# Patient Record
Sex: MALE | Race: ASIAN
[De-identification: names, ages, dates, MRNs, and addresses within clinical notes are randomized per-mention and may not be internally consistent; named-entity substitution may affect disease eponyms.]

---

## 2020-01-13 ENCOUNTER — HOSPITAL ENCOUNTER (INPATIENT)
Dept: HOSPITAL 53 - M ED | Age: 20
LOS: 2 days | Discharge: HOME | DRG: 882 | End: 2020-01-15
Attending: PSYCHIATRY & NEUROLOGY | Admitting: PSYCHIATRY & NEUROLOGY
Payer: COMMERCIAL

## 2020-01-13 VITALS — DIASTOLIC BLOOD PRESSURE: 97 MMHG | SYSTOLIC BLOOD PRESSURE: 140 MMHG

## 2020-01-13 VITALS — BODY MASS INDEX: 21.76 KG/M2 | WEIGHT: 138.67 LBS | HEIGHT: 67 IN

## 2020-01-13 DIAGNOSIS — F43.20: Primary | ICD-10-CM

## 2020-01-13 LAB
ALBUMIN SERPL BCG-MCNC: 3.9 GM/DL (ref 3.2–5.2)
ALT SERPL W P-5'-P-CCNC: 31 U/L (ref 12–78)
AMPHETAMINES UR QL SCN: NEGATIVE
APAP SERPL-MCNC: < 2 UG/ML (ref 10–30)
BARBITURATES UR QL SCN: NEGATIVE
BENZODIAZ UR QL SCN: NEGATIVE
BILIRUB CONJ SERPL-MCNC: 0.1 MG/DL (ref 0–0.2)
BILIRUB SERPL-MCNC: 0.2 MG/DL (ref 0.2–1)
BUN SERPL-MCNC: 13 MG/DL (ref 7–18)
BZE UR QL SCN: NEGATIVE
CALCIUM SERPL-MCNC: 8.4 MG/DL (ref 8.5–10.1)
CANNABINOIDS UR QL SCN: NEGATIVE
CHLORIDE SERPL-SCNC: 110 MEQ/L (ref 98–107)
CO2 SERPL-SCNC: 26 MEQ/L (ref 21–32)
CREAT SERPL-MCNC: 0.93 MG/DL (ref 0.7–1.3)
ETHANOL SERPL-MCNC: 0.12 % (ref 0–0.01)
GLUCOSE SERPL-MCNC: 97 MG/DL (ref 70–100)
HCT VFR BLD AUTO: 45.9 % (ref 42–52)
HGB BLD-MCNC: 15.2 G/DL (ref 13.5–17.5)
MCH RBC QN AUTO: 30.2 PG (ref 27–33)
MCHC RBC AUTO-ENTMCNC: 33.1 G/DL (ref 32–36.5)
MCV RBC AUTO: 91.3 FL (ref 80–96)
METHADONE UR QL SCN: NEGATIVE
OPIATES UR QL SCN: NEGATIVE
PCP UR QL SCN: NEGATIVE
PLATELET # BLD AUTO: 240 10^3/UL (ref 150–450)
POTASSIUM SERPL-SCNC: 4.4 MEQ/L (ref 3.5–5.1)
PROT SERPL-MCNC: 7.5 GM/DL (ref 6.4–8.2)
RBC # BLD AUTO: 5.03 10^6/UL (ref 4.3–6.1)
SALICYLATES SERPL-MCNC: < 1.7 MG/DL (ref 5–30)
SODIUM SERPL-SCNC: 144 MEQ/L (ref 136–145)
TSH SERPL DL<=0.005 MIU/L-ACNC: 0.6 UIU/ML (ref 0.46–3.98)
WBC # BLD AUTO: 5.7 10^3/UL (ref 4–10)

## 2020-01-13 RX ADMIN — Medication SCH MG: at 18:55

## 2020-01-13 RX ADMIN — SERTRALINE HYDROCHLORIDE SCH MG: 50 TABLET ORAL at 21:00

## 2020-01-14 VITALS — DIASTOLIC BLOOD PRESSURE: 66 MMHG | SYSTOLIC BLOOD PRESSURE: 128 MMHG

## 2020-01-14 VITALS — SYSTOLIC BLOOD PRESSURE: 120 MMHG | DIASTOLIC BLOOD PRESSURE: 69 MMHG

## 2020-01-14 VITALS — DIASTOLIC BLOOD PRESSURE: 69 MMHG | SYSTOLIC BLOOD PRESSURE: 120 MMHG

## 2020-01-14 RX ADMIN — Medication SCH MG: at 09:39

## 2020-01-14 RX ADMIN — Medication SCH MG: at 21:03

## 2020-01-14 RX ADMIN — SERTRALINE HYDROCHLORIDE SCH MG: 50 TABLET ORAL at 21:00

## 2020-01-14 NOTE — MHHPEPDOC
[facility] History & Physical


History and Physical


DATE OF ADMISSION: Jan 13, 2020 at 16:38








New Patient


Yasmany Dumont


MRN: N/[id number]


Date of Birth: N/A


Date of Service: 01/14/2020


Chief Complaint


"I had a moment."





History of Present Illness


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

presents after becoming stressed after recently starting at [place] straight 

out of basic, he had had significant stressors including a girlfriend that has 

left him where he became increasingly upset. He had done cutting for self 

soothing. He had shown this to his superior officer when he was brought into the

ER and admitted out of an abundance of caution.





The patient during his entire stay had denied being suicidal or homicidal 

ideation and that the cutting was not done to kill himself but simply to cope. 

The patient was met with or reported that he was not being "appreciative" of the

different things that he has. He was reporting that he had made some poor deci

sions in the multiple stressors and misses his family. However, he denies any 

symptoms consistent with clinical depression. He reports that the stressors had 

come at him all at once. Other than a low mood at times, he denies anything 

else.





Review Of Systems


Depression: Denies. Unprovoked depression. Reports symptoms consistent with 

adjustment disorder.





Anxiety: The patient denies any excessive worry associated with physical 

symptoms. They deny any experience of discreet panic in the past.





[place]: The patient denies any episodes of euphoria/dysphoria associated with 

decreased need for sleep, hedonism, talkatively or impulsivity lasting longer 

than 5 days.





Psychotic: The patient denies any experiences of auditory or visual 

hallucinations. They deny any episodes of paranoia or delusional thinking in the

past





Trauma: The patient denies any traumatic events associated with nightmares or 

intrusive thoughts.





Borderline: The patient screens negative for borderline personality at this 

junction.





Past Psychiatric History


The patient reports no history of psychiatric admissions, medication trials or 

current follow up.





Denies any history of suicide attempts.





Allergies


Please see below.





Family Psychiatric History


The patient denies/is unaware any history of mental health history including 

addictions and suicide.





Social History


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

in the [facility]. Very few supports in the local area. No legal trouble. Subsist 

on  income. Graduated high school. Grew up with parents,  with a 

close relationship, grew up primarily in [place]. Describes he has 2 younger b

valentinoers with a close relationship as well. Has only been in the  for 5 

months, recently has left basic training and has arrived to [place].





Substance Abuse History


The patient denies any excessive alcohol use, tobacco or illicit drug use, 

denies history of substance use treatment.





Medical History


Patient has no significant past medical history.





Mental Status Examination


General: Well dressed with good hygiene





Speech: Spontaneous and fluid





Thought processes: Linear and logical





MSK: Smooth and coordinated gait, no signs of tremors or involuntary orofacial 

movements





Thought content: Future orientated





Abstract reasoning, and computation: Intact





Description of associations: Intact





Description of abnormal or psychotic thoughts: Denies any suicidal or homicidal 

ideation. Denies any auditory or visual hallucinations. Does not appear to be 

responding to internal stimuli. Does not appear to be endorsing any bizarre or 

paranoid ideation.





Judgment: fair





Insight: fair





Orientation: Alert and orientated 3





Cognition: Grossly normal





Recent and remote memory: Intact





Attention span and concentration: Intact





Fund of knowledge: Adequate





Mood: "okay"





Affect: Euthymic with a full range





Diagnoses


Adjustment disorder, mild.





Assessment and Plan


Adjustment disorder: Discussed with patient the medication options versus 

supportive treatment and therapy, patient elects for psychotherapy at this time.

The patient will be discharged tomorrow as he does not meet involuntary 

criteria, denying suicidal and homicidal ideation through his observation and is

unlikely to be meeting criteria for extension of an involuntary admission that 

he is on. He declines voluntary and will be discharged home tomorrow.





Disposition


Discharged to [facility] tomorrow. 





Problem List


1. Risk for suicide.





2. Depression.





3. Ineffective coping.





Initial Treatment Plan


1. Patient was admitted on a 9.39 legal status. 


2. Complete history was obtained.


3. With patients permission, family will be contacted and database will be 

expanded. 


4. Patients medication regimen will be reviewed and changed accordingly. 


5. Patient will be provided with protected environment. 


6. Patient will be treated with individual, group, and milieu therapies. 


7. Patient will receive supportive psych-education.


8. Discharge planning will commence immediately.


9. Outpatient follow-up treatment will be strongly recommended.


10. The initial treatment plan will focus initially on:





Estimated Length Of Stay


2 days.





Time Spent


70 minutes.





Tuesday





Vital Signs





Vital Signs








  Date Time  Temp Pulse Resp B/P (MAP) Pulse Ox O2 Delivery O2 Flow Rate FiO2


 


1/14/20 07:07 98.6 89 16 128/66 (86)    


 


1/13/20 18:22     99 Room Air  











Medications


Scheduled PRN


Trazodone HCl (Trazodone HCl) 50 Mg Tablet, 50 MG PO QHSP PRN for INSOMNIA





Allergies


Coded Allergies:  


     No Known Allergies (Unverified , 1/13/20)











KALE ALCALA DO              Jan 14, 2020 09:50

## 2020-01-14 NOTE — CR.PDOC
General


Date of Consultation:  Jan 14, 2020





Consultation


REASON FOR CONSULTATION/CHIEF COMPLAINT: Admitted to inpatient mental health 

unit after cutting himself. 





HISTORY OF PRESENT ILLNESS: 19-year-old male, active , no significant 

past medical or psychiatric history is admitted to inpatient mental health unit 

after cutting himself. Patient reports feeling overwhelmed with his current 

duties in the  interfering with his family/social responsibilities. 

Patient wishes to be at home with his brothers who need his help and his uncle 

who is passing away from malignancy. Patient denies attempting to commit suicide

by cutting himself, reports using that as a way to relieve his tension. He has 

no medical complaints at this time, denies any shortness of breath, chest pain, 

nausea, vomiting, abdominal pain, diarrhea or constipation. He denies any 

suicidal ideation at this time.. 





10 point review of system is negative except for above





ALLERGIES: Please see below.





HOME MEDICATIONS: Please see below.





PAST MEDICAL HISTORY:


1. None.





PAST SURGICAL HISTORY: 


1. None





FAMILY HISTORY:


Negative for cancer, heart disease in his parents





SOCIAL HISTORY:


Never smoker.


Social alcohol use.


Denies drug use





PHYSICAL EXAMINATION:


VITAL SIGNS: Please see below.


GENERAL: No distress


HEENT: Normocephalic, atraumatic, moist mucous membranes


NECK: Supple


CARDIOVASCULAR EXAMINATION: S1, S2, no murmurs


RESPIRATORY EXAMINATION: Clear to auscultation, no wheezing


ABDOMINAL EXAMINATION: Soft, nontender, nondistended, positive bowel sounds


EXTREMITIES: Range of motion intact


SKIN: No rash


NEUROLOGICAL EXAMINATION: Alert and oriented 3, no focal deficits 


PSYCHIATRIC EXAMINATION: Calm and cooperative





LABORATORY DATA: Please see below.





ASSESSMENT/PLAN: 19-year-old male without any significant psychiatric or medical

history is admitted to inpatient mental health unit after cutting himself.


1. Self cutting.


 Management as per primary team





Patient has no active medical issues at this time, please reconsult if needed.





Vital Signs/I&O





Vital Signs








  Date Time  Temp Pulse Resp B/P (MAP) Pulse Ox O2 Delivery O2 Flow Rate FiO2


 


1/14/20 07:07 98.6 89 16 128/66 (86)    


 


1/13/20 18:22     99 Room Air  











Allergies


Coded Allergies:  


     No Known Allergies (Unverified , 1/13/20)





Home Medications


Scheduled PRN


Trazodone HCl (Trazodone HCl) 50 Mg Tablet, 50 MG PO QHSP PRN for INSOMNIA for 7

Days, #7











GONDAL,KHUBAIB N. MD           Jan 14, 2020 14:14

## 2020-01-15 VITALS — DIASTOLIC BLOOD PRESSURE: 70 MMHG | SYSTOLIC BLOOD PRESSURE: 115 MMHG

## 2020-01-15 NOTE — MHDSPDOC
[facility] Discharge Summary


Discharge Summary


DATE OF ADMISSION: Jan 13, 2020 at 16:38 


DATE OF DISCHARGE: 1/15/20








Discharge


Yasmany Dumont


MRN: N/A


Date of Birth: N/A


Date of Service: 01/15/2020


Diagnoses


Adjustment disorder, mild.





History of Present Illness


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

presents after becoming stressed after recently starting at [place] straight 

out of [employer], he had had significant stressors including a girlfriend that has 

left him where he became increasingly upset. He had done cutting for self 

soothing. He had shown this to his superior officer when he was brought into the

ER and admitted out of an abundance of caution.





The patient during his entire stay had denied being suicidal or homicidal 

ideation and that the cutting was not done to kill himself but simply to cope. 

The patient was met with or reported that he was not being "appreciative" of the

different things that he has. He was reporting that he had made some poor 

decisions in the multiple stressors and misses his family. However, he denies 

any symptoms consistent with clinical depression. He reports that the stressors 

had come at him all at once. Other than a low mood at times, he denies anything 

else.





Consultants Involved


Hospitalist/PCP screening





Treatment and Progress On The Unit


The patient was admitted to the inpatient mental health unit out of an abundance

of caution. He had been denying suicidal and homicidal ideations after the 

reported self-soothing cutting. The patient did well on the unit, was 

cooperative, engaged, friendly and had requested discharge shortly after his 

presentation. During his stay, we had discussed medications and the risks and 

benefits as well as therapies given his particular diagnosis, which the patient 

elected to try therapy alone. He reported that his stay did give him perspective

on how good his situation was. He was discharged in good summer as he no longer 

met involuntary criteria and declined further voluntary.





Discharge Assessment


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 

current situations, presents for concern of suicide. After 48 hours of 

observation reveals no suicidal or homicidal ideation, normal mental status 

exam, cooperative with discharge planning, a good and fair insight and judgment 

while on the unit. He declines further voluntary admission and thus cannot be 

ethically extended on an involuntary admission and is discharged in good summer.





Mental Status Examination


General: Well dressed with good hygiene





Speech: Spontaneous and fluid





Thought processes: Linear and logical





MSK: Smooth and coordinated gait, no signs of tremors or involuntary orofacial 

movements





Thought content: Future orientated





Abstract reasoning, and computation: Intact





Description of associations: Intact





Description of abnormal or psychotic thoughts: Denies any suicidal or homicidal 

ideation. Denies any auditory or visual hallucinations. Does not appear to be 

responding to internal stimuli. Does not appear to be endorsing any bizarre or 

paranoid ideation.





Judgment: fair





Insight: fair





Orientation: Alert and orientated 3





Cognition: Grossly normal





Recent and remote memory: Intact





Attention span and concentration: Intact





Fund of knowledge: Adequate





Mood: "okay"





Affect: Euthymic with a full range





Follow Up








The social work team worked during the predischarge meeting in order to evaluate

for further issues of lethality address them fully before discharge. They worked

on safety planning with the patient's family members in order to ensure that the

patient will have a safe and effective discharge.





Time Spent


The amount of time spent in the coordination of care for this patient was 

approximately 60 minutes.





Wednesday





Vital Signs/I&Os





Vital Signs








  Date Time  Temp Pulse Resp B/P (MAP) Pulse Ox O2 Delivery O2 Flow Rate FiO2


 


1/15/20 06:53 98.6 63 14 115/70 (85)    


 


1/13/20 18:22     99 Room Air  











Medications


Scheduled PRN


Trazodone HCl (Trazodone HCl) 50 Mg Tablet, 50 MG PO QHSP PRN for INSOMNIA for 7

Days, #7





Allergies


Coded Allergies:  


     No Known Allergies (Unverified , 1/13/20)











KALE ALCALA DO              Martin 15, 2020 08:07

## 2020-04-04 ENCOUNTER — HOSPITAL ENCOUNTER (EMERGENCY)
Dept: HOSPITAL 53 - M ED | Age: 20
Discharge: HOME | End: 2020-04-04
Payer: COMMERCIAL

## 2020-04-04 VITALS
BODY MASS INDEX: 23.98 KG/M2 | HEIGHT: 67 IN | DIASTOLIC BLOOD PRESSURE: 67 MMHG | SYSTOLIC BLOOD PRESSURE: 133 MMHG | WEIGHT: 152.78 LBS

## 2020-04-04 DIAGNOSIS — H00.015: Primary | ICD-10-CM

## 2024-04-05 ENCOUNTER — APPOINTMENT (OUTPATIENT)
Dept: GENERAL RADIOLOGY | Facility: HOSPITAL | Age: 24
End: 2024-04-05
Attending: EMERGENCY MEDICINE
Payer: COMMERCIAL

## 2024-04-05 ENCOUNTER — HOSPITAL ENCOUNTER (EMERGENCY)
Facility: HOSPITAL | Age: 24
Discharge: HOME | End: 2024-04-05
Attending: EMERGENCY MEDICINE
Payer: COMMERCIAL

## 2024-04-05 ENCOUNTER — APPOINTMENT (OUTPATIENT)
Dept: CT IMAGING | Facility: HOSPITAL | Age: 24
End: 2024-04-05
Attending: EMERGENCY MEDICINE
Payer: COMMERCIAL

## 2024-04-05 VITALS
HEART RATE: 97 BPM | TEMPERATURE: 100 F | DIASTOLIC BLOOD PRESSURE: 82 MMHG | SYSTOLIC BLOOD PRESSURE: 129 MMHG | OXYGEN SATURATION: 100 % | RESPIRATION RATE: 16 BRPM

## 2024-04-05 DIAGNOSIS — J11.1 INFLUENZA-LIKE ILLNESS: Primary | ICD-10-CM

## 2024-04-05 LAB
ALBUMIN SERPL-MCNC: 4.8 G/DL (ref 3.2–4.8)
ALBUMIN/GLOB SERPL: 1.5 {RATIO} (ref 1–2)
ALP LIVER SERPL-CCNC: 63 U/L
ALT SERPL-CCNC: 15 U/L
ANION GAP SERPL CALC-SCNC: 7 MMOL/L (ref 0–18)
AST SERPL-CCNC: 22 U/L (ref ?–34)
BASOPHILS # BLD AUTO: 0.03 X10(3) UL (ref 0–0.2)
BASOPHILS NFR BLD AUTO: 0.4 %
BILIRUB SERPL-MCNC: 0.8 MG/DL (ref 0.3–1.2)
BILIRUB UR QL: NEGATIVE
BUN BLD-MCNC: 13 MG/DL (ref 9–23)
BUN/CREAT SERPL: 11.8 (ref 10–20)
CALCIUM BLD-MCNC: 9.7 MG/DL (ref 8.7–10.4)
CHLORIDE SERPL-SCNC: 104 MMOL/L (ref 98–112)
CLARITY UR: CLEAR
CO2 SERPL-SCNC: 26 MMOL/L (ref 21–32)
CREAT BLD-MCNC: 1.1 MG/DL
DEPRECATED RDW RBC AUTO: 35.4 FL (ref 35.1–46.3)
EGFRCR SERPLBLD CKD-EPI 2021: 96 ML/MIN/1.73M2 (ref 60–?)
EOSINOPHIL # BLD AUTO: 0.02 X10(3) UL (ref 0–0.7)
EOSINOPHIL NFR BLD AUTO: 0.2 %
ERYTHROCYTE [DISTWIDTH] IN BLOOD BY AUTOMATED COUNT: 11.8 % (ref 11–15)
FLUAV + FLUBV RNA SPEC NAA+PROBE: NEGATIVE
FLUAV + FLUBV RNA SPEC NAA+PROBE: NEGATIVE
GLOBULIN PLAS-MCNC: 3.3 G/DL (ref 2.8–4.4)
GLUCOSE BLD-MCNC: 104 MG/DL (ref 70–99)
GLUCOSE UR-MCNC: NORMAL MG/DL
HCT VFR BLD AUTO: 40.5 %
HETEROPH AB SER QL: NEGATIVE
HGB BLD-MCNC: 14.3 G/DL
HGB UR QL STRIP.AUTO: NEGATIVE
IMM GRANULOCYTES # BLD AUTO: 0.02 X10(3) UL (ref 0–1)
IMM GRANULOCYTES NFR BLD: 0.2 %
KETONES UR-MCNC: 60 MG/DL
LEUKOCYTE ESTERASE UR QL STRIP.AUTO: NEGATIVE
LYMPHOCYTES # BLD AUTO: 1.68 X10(3) UL (ref 1–4)
LYMPHOCYTES NFR BLD AUTO: 19.8 %
MCH RBC QN AUTO: 29 PG (ref 26–34)
MCHC RBC AUTO-ENTMCNC: 35.3 G/DL (ref 31–37)
MCV RBC AUTO: 82.2 FL
MONOCYTES # BLD AUTO: 0.65 X10(3) UL (ref 0.1–1)
MONOCYTES NFR BLD AUTO: 7.7 %
NEUTROPHILS # BLD AUTO: 6.07 X10 (3) UL (ref 1.5–7.7)
NEUTROPHILS # BLD AUTO: 6.07 X10(3) UL (ref 1.5–7.7)
NEUTROPHILS NFR BLD AUTO: 71.7 %
NITRITE UR QL STRIP.AUTO: NEGATIVE
OSMOLALITY SERPL CALC.SUM OF ELEC: 284 MOSM/KG (ref 275–295)
PH UR: 6.5 [PH] (ref 5–8)
PLATELET # BLD AUTO: 177 10(3)UL (ref 150–450)
POTASSIUM SERPL-SCNC: 4 MMOL/L (ref 3.5–5.1)
PROT SERPL-MCNC: 8.1 G/DL (ref 5.7–8.2)
PROT UR-MCNC: NEGATIVE MG/DL
RBC # BLD AUTO: 4.93 X10(6)UL
RSV RNA SPEC NAA+PROBE: NEGATIVE
S PYO AG THROAT QL: NEGATIVE
SARS-COV-2 RNA RESP QL NAA+PROBE: NOT DETECTED
SODIUM SERPL-SCNC: 137 MMOL/L (ref 136–145)
SP GR UR STRIP: 1.01 (ref 1–1.03)
TROPONIN I SERPL HS-MCNC: 4 NG/L
UROBILINOGEN UR STRIP-ACNC: NORMAL
WBC # BLD AUTO: 8.5 X10(3) UL (ref 4–11)

## 2024-04-05 PROCEDURE — 80053 COMPREHEN METABOLIC PANEL: CPT

## 2024-04-05 PROCEDURE — 85025 COMPLETE CBC W/AUTO DIFF WBC: CPT

## 2024-04-05 PROCEDURE — 99284 EMERGENCY DEPT VISIT MOD MDM: CPT

## 2024-04-05 PROCEDURE — 93010 ELECTROCARDIOGRAM REPORT: CPT

## 2024-04-05 PROCEDURE — 99285 EMERGENCY DEPT VISIT HI MDM: CPT

## 2024-04-05 PROCEDURE — 0241U SARS-COV-2/FLU A AND B/RSV BY PCR (GENEXPERT): CPT | Performed by: EMERGENCY MEDICINE

## 2024-04-05 PROCEDURE — 93005 ELECTROCARDIOGRAM TRACING: CPT

## 2024-04-05 PROCEDURE — 80053 COMPREHEN METABOLIC PANEL: CPT | Performed by: EMERGENCY MEDICINE

## 2024-04-05 PROCEDURE — 86308 HETEROPHILE ANTIBODY SCREEN: CPT | Performed by: EMERGENCY MEDICINE

## 2024-04-05 PROCEDURE — 71045 X-RAY EXAM CHEST 1 VIEW: CPT | Performed by: EMERGENCY MEDICINE

## 2024-04-05 PROCEDURE — 74177 CT ABD & PELVIS W/CONTRAST: CPT | Performed by: EMERGENCY MEDICINE

## 2024-04-05 PROCEDURE — 87880 STREP A ASSAY W/OPTIC: CPT

## 2024-04-05 PROCEDURE — 84484 ASSAY OF TROPONIN QUANT: CPT | Performed by: EMERGENCY MEDICINE

## 2024-04-05 PROCEDURE — 0241U SARS-COV-2/FLU A AND B/RSV BY PCR (GENEXPERT): CPT

## 2024-04-05 PROCEDURE — 96360 HYDRATION IV INFUSION INIT: CPT

## 2024-04-05 PROCEDURE — 85025 COMPLETE CBC W/AUTO DIFF WBC: CPT | Performed by: EMERGENCY MEDICINE

## 2024-04-05 PROCEDURE — 81003 URINALYSIS AUTO W/O SCOPE: CPT | Performed by: EMERGENCY MEDICINE

## 2024-04-06 NOTE — ED PROVIDER NOTES
Patient Seen in: [facility] Emergency Department      History     Chief Complaint   Patient presents with    Other     Stated Complaint: self reported organ failure    Subjective:   HPI    Patient presents the emergency department complaining of fever, chills, mild headache, low back pain.  There is a mild cough.  He states his son has been sick at home with a sore throat as well.  There is no other aggravating relieving factors.  He states that when he moves his head he feels dizziness which is described as lightheadedness.  There is no disequilibrium, off-balance sensation or vertigo symptoms or sensation of room spinning.  There is no visual disturbance.  There is no neck pain or neck stiffness.  There is no frequency of urination or dysuria.  He had numerous other questions including whether we could do a \"test for cancer\".  He also stated that he was concerned he was having \"organ failure\".    Objective:   History reviewed. No pertinent past medical history.           No pertinent past surgical history.              No pertinent social history.            Review of Systems    Positive for stated complaint: self reported organ failure  Other systems are as noted in HPI.  Constitutional and vital signs reviewed.      All other systems reviewed and negative except as noted above.    Physical Exam     ED Triage Vitals [04/05/24 1918]   /90   Pulse (!) 127   Resp 20   Temp 100 °F (37.8 °C)   Temp src Oral   SpO2 99 %   O2 Device None (Room air)       Current:/82   Pulse 97   Temp 100 °F (37.8 °C) (Oral)   Resp 16   SpO2 100%         Physical Exam  Vitals and nursing note reviewed.   Constitutional:       General: He is not in acute distress.     Appearance: He is well-developed.   HENT:      Head: Normocephalic.      Nose: Nose normal.      Mouth/Throat:      Mouth: Mucous membranes are moist.   Eyes:      Conjunctiva/sclera: Conjunctivae normal.   Cardiovascular:      Rate and Rhythm:  Normal rate and regular rhythm.      Heart sounds: No murmur heard.  Pulmonary:      Effort: Pulmonary effort is normal. No respiratory distress.      Breath sounds: Normal breath sounds.   Abdominal:      General: There is no distension.      Palpations: Abdomen is soft.      Tenderness: There is no abdominal tenderness.   Musculoskeletal:         General: No tenderness. Normal range of motion.      Cervical back: Normal range of motion and neck supple.   Skin:     General: Skin is warm and dry.      Findings: No rash.   Neurological:      General: No focal deficit present.      Mental Status: He is alert and oriented to person, place, and time.      Cranial Nerves: No cranial nerve deficit.      Sensory: No sensory deficit.      Motor: No weakness.      Coordination: Coordination normal.      Gait: Gait normal.      Deep Tendon Reflexes: Reflexes normal.               ED Course     Labs Reviewed   COMP METABOLIC PANEL (14) - Abnormal; Notable for the following components:       Result Value    Glucose 104 (*)     All other components within normal limits   URINALYSIS WITH CULTURE REFLEX - Abnormal; Notable for the following components:    Ketones Urine 60 (*)     All other components within normal limits   MONONUCLEOSIS, QUAL - Normal   TROPONIN I HIGH SENSITIVITY - Normal   POCT RAPID STREP - Normal   SARS-COV-2/FLU A AND B/RSV BY PCR (byydPERT) - Normal    Narrative:     This test is intended for the qualitative detection and differentiation of SARS-CoV-2, influenza A, influenza B, and respiratory syncytial virus (RSV) viral RNA in nasopharyngeal or nares swabs from individuals suspected of respiratory viral infection consistent with COVID-19 by their healthcare provider. Signs and symptoms of respiratory viral infection due to SARS-CoV-2, influenza, and RSV can be similar.    Test performed using the Xpert Xpress SARS-CoV-2/FLU/RSV (real time RT-PCR)  assay on the GeneXpert instrument, [employer], [place]  [address].   This test is being used under the Food and Drug Administration's Emergency Use Authorization.    The authorized Fact Sheet for Healthcare Providers for this assay is available upon request from the laboratory.   CBC WITH DIFFERENTIAL WITH PLATELET    Narrative:     The following orders were created for panel order CBC With Differential With Platelet.  Procedure                               Abnormality         Status                     ---------                               -----------         ------                     CBC W/ DIFFERENTIAL[[id number]]                              Final result                 Please view results for these tests on the individual orders.   URINALYSIS WITH CULTURE REFLEX   CBC W/ DIFFERENTIAL     EKG    Rate, intervals and axes as noted on EKG Report.  Rate: 124 bpm  Rhythm: Sinus Rhythm  Reading: Sinus tachycardia              EKG    Indication: dizziness  Rhythm: NSR  Comparison: No old EKG for Comparison  ST Segment: NS changes  Interpretation: no acute injury pattern.                MDM                        Medical Decision Making  Diagnosis considered for pneumonia, viral illness, strep pharyngitis, enteritis, kidney stone    Problems Addressed:  Influenza-like illness: acute illness or injury    Amount and/or Complexity of Data Reviewed  Labs: ordered. Decision-making details documented in ED Course.     Details: Laboratory studies unremarkable including normal flu, mono, COVID, CBC and comprehensive metabolic panel.  Radiology: ordered and independent interpretation performed. Decision-making details documented in ED Course.     Details: Chest x-ray normal, no pneumonia.  CT scan of the abdomen shows no kidney stones or focal abnormality.  ECG/medicine tests: ordered and independent interpretation performed. Decision-making details documented in ED Course.  Discussion of management or test interpretation with external provider(s): Patient admits to having significant  panic upon arrival today thinking that his \"organs were failing\".  Tachycardia on original EKG had nonspecific rate related changes.  After hydration and relaxation techniques, heart rate came down to normal range and abnormalities resolved.  Some nonspecific changes remain but nothing to indicate acute cardiac process.  Normal troponin.  I explained that I suspect he has a influenza-like illness considering his son had similar symptoms earlier this week.  He understands to utilize Tylenol ibuprofen and drink plenty of liquids.  Return for worsening condition.    Risk  OTC drugs.        Disposition and Plan     Clinical Impression:  1. Influenza-like illness         Disposition:  Discharge  4/5/2024 11:03 pm    Follow-up:  Brandon Ornelas, DO  [address] [place] [id number] [phone]    Schedule an appointment as soon as possible for a visit            Medications Prescribed:  There are no discharge medications for this patient.

## 2024-04-06 NOTE — ED INITIAL ASSESSMENT (HPI)
Patient states \"I think I'm in organ failure.\" Patient reports dizziness, lower back pain, and fevers for the last 4 days. Patient reports dizziness increases when moving faster

## 2024-04-06 NOTE — ED QUICK NOTES
Rounding Completed    Plan of Care reviewed. Waiting for CT results.  Elimination needs assessed.  Patient resting in bed, speaking in full sentences. Pt on VS monitor for frequent VS assessments. No new requests at this time.     Bed is locked and in lowest position. Call light within reach.

## 2024-04-06 NOTE — DISCHARGE INSTRUCTIONS
Drink plenty of fluids. Tylenol or Ibuprofen as needed for fever, pain. Follow up with PMD as directed.

## 2024-04-07 LAB
ATRIAL RATE: 124 BPM
ATRIAL RATE: 97 BPM
P AXIS: -6 DEGREES
P AXIS: 71 DEGREES
P-R INTERVAL: 158 MS
P-R INTERVAL: 158 MS
Q-T INTERVAL: 302 MS
Q-T INTERVAL: 348 MS
QRS DURATION: 104 MS
QRS DURATION: 98 MS
QTC CALCULATION (BEZET): 433 MS
QTC CALCULATION (BEZET): 441 MS
R AXIS: 30 DEGREES
R AXIS: 87 DEGREES
T AXIS: 18 DEGREES
T AXIS: 35 DEGREES
VENTRICULAR RATE: 124 BPM
VENTRICULAR RATE: 97 BPM